# Patient Record
Sex: MALE | Race: WHITE | NOT HISPANIC OR LATINO | Employment: UNEMPLOYED | URBAN - METROPOLITAN AREA
[De-identification: names, ages, dates, MRNs, and addresses within clinical notes are randomized per-mention and may not be internally consistent; named-entity substitution may affect disease eponyms.]

---

## 2021-02-10 ENCOUNTER — OFFICE VISIT (OUTPATIENT)
Dept: URGENT CARE | Facility: PHYSICIAN GROUP | Age: 26
End: 2021-02-10
Payer: COMMERCIAL

## 2021-02-10 VITALS
BODY MASS INDEX: 24.92 KG/M2 | TEMPERATURE: 98.5 F | DIASTOLIC BLOOD PRESSURE: 60 MMHG | WEIGHT: 178 LBS | OXYGEN SATURATION: 96 % | HEIGHT: 71 IN | HEART RATE: 79 BPM | SYSTOLIC BLOOD PRESSURE: 110 MMHG | RESPIRATION RATE: 16 BRPM

## 2021-02-10 DIAGNOSIS — R21 RASH: ICD-10-CM

## 2021-02-10 PROCEDURE — 99203 OFFICE O/P NEW LOW 30 MIN: CPT | Performed by: PHYSICIAN ASSISTANT

## 2021-02-10 RX ORDER — TRIAMCINOLONE ACETONIDE 1 MG/G
0.25 CREAM TOPICAL 2 TIMES DAILY
Qty: 45 G | Refills: 0 | Status: SHIPPED | OUTPATIENT
Start: 2021-02-10 | End: 2021-02-24

## 2021-02-10 ASSESSMENT — ENCOUNTER SYMPTOMS
COUGH: 0
MYALGIAS: 0
SORE THROAT: 0
FEVER: 0
SHORTNESS OF BREATH: 0
PALPITATIONS: 0

## 2021-02-11 NOTE — PROGRESS NOTES
"Subjective:   Jan Ledbetter is a 25 y.o. male who presents for Rash (arm rash on and off on arms, x2 years )      Rash  This is a chronic problem. The current episode started more than 1 year ago. The problem is unchanged. Location: hand and arms. The rash is characterized by dryness, itchiness and redness. It is unknown if there was an exposure to a precipitant. Pertinent negatives include no cough, fever, joint pain, shortness of breath or sore throat. Past treatments include nothing.       Review of Systems   Constitutional: Negative for fever and malaise/fatigue.   HENT: Negative for sore throat.    Respiratory: Negative for cough and shortness of breath.    Cardiovascular: Negative for chest pain and palpitations.   Musculoskeletal: Negative for joint pain and myalgias.   Skin: Positive for itching and rash.   All other systems reviewed and are negative.      Medications:    • triamcinolone acetonide Crea    Allergies: Patient has no known allergies.    Problem List: Jan Ledbetter does not have a problem list on file.    Surgical History:  No past surgical history on file.    Past Social Hx: Jan Ledbetter  reports that he has never smoked. He has never used smokeless tobacco. He reports previous alcohol use. He reports that he does not use drugs.     Past Family Hx:  Jan Ledbetter family history is not on file.     Problem list, medications, and allergies reviewed by myself today in Epic.     Objective:     Blood Pressure 110/60 (BP Location: Right arm, Patient Position: Sitting, BP Cuff Size: Adult)   Pulse 79   Temperature 36.9 °C (98.5 °F) (Temporal)   Respiration 16   Height 1.803 m (5' 11\")   Weight 80.7 kg (178 lb)   Oxygen Saturation 96%   Body Mass Index 24.83 kg/m²     Physical Exam  Vitals reviewed.   Constitutional:       Appearance: He is well-developed.   Cardiovascular:      Rate and Rhythm: Normal rate and regular rhythm.      Heart sounds: Normal heart sounds.   Pulmonary:      Effort: " Pulmonary effort is normal.      Breath sounds: Normal breath sounds.   Musculoskeletal:         General: Normal range of motion.   Skin:     General: Skin is warm and dry.      Findings: Rash present.      Comments: Rash distributions:  Located:hand/arms  Size: various  Pattern: papulosquamous with escoriations   Neurological:      Mental Status: He is alert and oriented to person, place, and time.   Psychiatric:         Behavior: Behavior normal.         Thought Content: Thought content normal.         Judgment: Judgment normal.         Assessment/Plan:     Medical Decision Making/Comments     Pt is a 25 yr old male who presents for evaluation of a rash.  Pt states he has had a chronic rash for over 2 yrs.  Pt endorses pain with the rash.  No prior episodes.  Pt denies fever, sore throat, or arthralgias.   No new medications in last 2-3 months.  No recent travel, time outdoors, or occupational exposure.  Pt is up to date with vaccinations.  Vital signs normal.  On exam the rash appears to be distributed in a papulosquamous pattern with escoriations.  The rash is bilateral on the arms.  It does cover the flexor surfaces.  Likely eczema.    Diagnosis differential includes, but not limited to:   Morbilliform (maculopapular): measles, monocucleosis, viral exanthem, NOS, drug reaction, graft vs host, evolving.   Papulosquamous: syphilis, scabies, tinea, eczema, pityriasis rosea, psoriasis, lichen planus, lupus            Diagnosis and associated orders     1. Rash  triamcinolone acetonide (KENALOG) 0.1 % Cream              Differential diagnosis, natural history, supportive care, and indications for immediate follow-up discussed.    Advised the patient to follow-up with the primary care physician for recheck, reevaluation, and consideration of further management.    Please note that this dictation was created using voice recognition software. I have made a reasonable attempt to correct obvious errors, but I expect that  there are errors of grammar and possibly content that I did not discover before finalizing the note.

## 2022-05-13 ENCOUNTER — PRE-ADMISSION TESTING (OUTPATIENT)
Dept: ADMISSIONS | Facility: MEDICAL CENTER | Age: 27
End: 2022-05-13
Attending: ORTHOPAEDIC SURGERY
Payer: COMMERCIAL

## 2022-05-13 NOTE — OR NURSING
"Preadmit appointment: \" Preparing for your Procedure information\" sheet given to patient with verbal and written instructions. Patient instructed to continue prescribed medications through the day before surgery, instructed to take the following medications the day of surgery per anesthesia protocol:  NONE          Verbal and written, and pre-admit video website instructions provided.           "

## 2022-05-23 ENCOUNTER — ANESTHESIA (OUTPATIENT)
Dept: SURGERY | Facility: MEDICAL CENTER | Age: 27
End: 2022-05-23
Payer: COMMERCIAL

## 2022-05-23 ENCOUNTER — ANESTHESIA EVENT (OUTPATIENT)
Dept: SURGERY | Facility: MEDICAL CENTER | Age: 27
End: 2022-05-23
Payer: COMMERCIAL

## 2022-05-23 ENCOUNTER — APPOINTMENT (OUTPATIENT)
Dept: RADIOLOGY | Facility: MEDICAL CENTER | Age: 27
End: 2022-05-23
Attending: ORTHOPAEDIC SURGERY
Payer: COMMERCIAL

## 2022-05-23 ENCOUNTER — HOSPITAL ENCOUNTER (OUTPATIENT)
Facility: MEDICAL CENTER | Age: 27
End: 2022-05-23
Attending: ORTHOPAEDIC SURGERY | Admitting: ORTHOPAEDIC SURGERY
Payer: COMMERCIAL

## 2022-05-23 VITALS
RESPIRATION RATE: 16 BRPM | HEIGHT: 71 IN | HEART RATE: 105 BPM | TEMPERATURE: 97 F | BODY MASS INDEX: 24.38 KG/M2 | SYSTOLIC BLOOD PRESSURE: 130 MMHG | OXYGEN SATURATION: 98 % | WEIGHT: 174.16 LBS | DIASTOLIC BLOOD PRESSURE: 75 MMHG

## 2022-05-23 PROCEDURE — 160042 HCHG SURGERY MINUTES - EA ADDL 1 MIN LEVEL 5: Performed by: ORTHOPAEDIC SURGERY

## 2022-05-23 PROCEDURE — 700101 HCHG RX REV CODE 250: Performed by: ANESTHESIOLOGY

## 2022-05-23 PROCEDURE — 160009 HCHG ANES TIME/MIN: Performed by: ORTHOPAEDIC SURGERY

## 2022-05-23 PROCEDURE — 160048 HCHG OR STATISTICAL LEVEL 1-5: Performed by: ORTHOPAEDIC SURGERY

## 2022-05-23 PROCEDURE — 160031 HCHG SURGERY MINUTES - 1ST 30 MINS LEVEL 5: Performed by: ORTHOPAEDIC SURGERY

## 2022-05-23 PROCEDURE — A9270 NON-COVERED ITEM OR SERVICE: HCPCS | Performed by: ANESTHESIOLOGY

## 2022-05-23 PROCEDURE — 01230 ANES OPN UPPER 2/3 FEMUR NOS: CPT | Performed by: ANESTHESIOLOGY

## 2022-05-23 PROCEDURE — 700111 HCHG RX REV CODE 636 W/ 250 OVERRIDE (IP): Performed by: ANESTHESIOLOGY

## 2022-05-23 PROCEDURE — 160002 HCHG RECOVERY MINUTES (STAT): Performed by: ORTHOPAEDIC SURGERY

## 2022-05-23 PROCEDURE — 73502 X-RAY EXAM HIP UNI 2-3 VIEWS: CPT | Mod: RT

## 2022-05-23 PROCEDURE — 160036 HCHG PACU - EA ADDL 30 MINS PHASE I: Performed by: ORTHOPAEDIC SURGERY

## 2022-05-23 PROCEDURE — 501838 HCHG SUTURE GENERAL: Performed by: ORTHOPAEDIC SURGERY

## 2022-05-23 PROCEDURE — 700111 HCHG RX REV CODE 636 W/ 250 OVERRIDE (IP): Performed by: ORTHOPAEDIC SURGERY

## 2022-05-23 PROCEDURE — 160035 HCHG PACU - 1ST 60 MINS PHASE I: Performed by: ORTHOPAEDIC SURGERY

## 2022-05-23 PROCEDURE — 700102 HCHG RX REV CODE 250 W/ 637 OVERRIDE(OP): Performed by: ANESTHESIOLOGY

## 2022-05-23 PROCEDURE — 160046 HCHG PACU - 1ST 60 MINS PHASE II: Performed by: ORTHOPAEDIC SURGERY

## 2022-05-23 PROCEDURE — C1713 ANCHOR/SCREW BN/BN,TIS/BN: HCPCS | Performed by: ORTHOPAEDIC SURGERY

## 2022-05-23 DEVICE — ANCHOR SUTURE NANOTACK XBRAID TITANIUM.2MM TAPE 1.4MM(1EA): Type: IMPLANTABLE DEVICE | Site: HIP | Status: FUNCTIONAL

## 2022-05-23 RX ORDER — ONDANSETRON 2 MG/ML
INJECTION INTRAMUSCULAR; INTRAVENOUS PRN
Status: DISCONTINUED | OUTPATIENT
Start: 2022-05-23 | End: 2022-05-23 | Stop reason: SURG

## 2022-05-23 RX ORDER — CELECOXIB 200 MG/1
200 CAPSULE ORAL ONCE
Status: COMPLETED | OUTPATIENT
Start: 2022-05-23 | End: 2022-05-23

## 2022-05-23 RX ORDER — OXYCODONE HCL 5 MG/5 ML
5 SOLUTION, ORAL ORAL
Status: DISCONTINUED | OUTPATIENT
Start: 2022-05-23 | End: 2022-05-23 | Stop reason: HOSPADM

## 2022-05-23 RX ORDER — MIDAZOLAM HYDROCHLORIDE 1 MG/ML
INJECTION INTRAMUSCULAR; INTRAVENOUS PRN
Status: DISCONTINUED | OUTPATIENT
Start: 2022-05-23 | End: 2022-05-23 | Stop reason: SURG

## 2022-05-23 RX ORDER — SODIUM CHLORIDE, SODIUM LACTATE, POTASSIUM CHLORIDE, CALCIUM CHLORIDE 600; 310; 30; 20 MG/100ML; MG/100ML; MG/100ML; MG/100ML
INJECTION, SOLUTION INTRAVENOUS CONTINUOUS
Status: ACTIVE | OUTPATIENT
Start: 2022-05-23 | End: 2022-05-23

## 2022-05-23 RX ORDER — CEFAZOLIN SODIUM 1 G/3ML
INJECTION, POWDER, FOR SOLUTION INTRAMUSCULAR; INTRAVENOUS PRN
Status: DISCONTINUED | OUTPATIENT
Start: 2022-05-23 | End: 2022-05-23 | Stop reason: SURG

## 2022-05-23 RX ORDER — DIPHENHYDRAMINE HYDROCHLORIDE 50 MG/ML
12.5 INJECTION INTRAMUSCULAR; INTRAVENOUS
Status: DISCONTINUED | OUTPATIENT
Start: 2022-05-23 | End: 2022-05-23 | Stop reason: HOSPADM

## 2022-05-23 RX ORDER — HALOPERIDOL 5 MG/ML
1 INJECTION INTRAMUSCULAR
Status: DISCONTINUED | OUTPATIENT
Start: 2022-05-23 | End: 2022-05-23 | Stop reason: HOSPADM

## 2022-05-23 RX ORDER — ONDANSETRON 2 MG/ML
4 INJECTION INTRAMUSCULAR; INTRAVENOUS
Status: COMPLETED | OUTPATIENT
Start: 2022-05-23 | End: 2022-05-23

## 2022-05-23 RX ORDER — ACETAMINOPHEN 500 MG
1000 TABLET ORAL ONCE
Status: COMPLETED | OUTPATIENT
Start: 2022-05-23 | End: 2022-05-23

## 2022-05-23 RX ORDER — ROPIVACAINE HYDROCHLORIDE 5 MG/ML
INJECTION, SOLUTION EPIDURAL; INFILTRATION; PERINEURAL
Status: DISCONTINUED | OUTPATIENT
Start: 2022-05-23 | End: 2022-05-23 | Stop reason: HOSPADM

## 2022-05-23 RX ORDER — OXYCODONE HCL 5 MG/5 ML
10 SOLUTION, ORAL ORAL
Status: DISCONTINUED | OUTPATIENT
Start: 2022-05-23 | End: 2022-05-23 | Stop reason: HOSPADM

## 2022-05-23 RX ORDER — ROCURONIUM BROMIDE 10 MG/ML
INJECTION, SOLUTION INTRAVENOUS PRN
Status: DISCONTINUED | OUTPATIENT
Start: 2022-05-23 | End: 2022-05-23 | Stop reason: SURG

## 2022-05-23 RX ORDER — DEXAMETHASONE SODIUM PHOSPHATE 4 MG/ML
INJECTION, SOLUTION INTRA-ARTICULAR; INTRALESIONAL; INTRAMUSCULAR; INTRAVENOUS; SOFT TISSUE PRN
Status: DISCONTINUED | OUTPATIENT
Start: 2022-05-23 | End: 2022-05-23 | Stop reason: SURG

## 2022-05-23 RX ADMIN — CELECOXIB 200 MG: 200 CAPSULE ORAL at 09:56

## 2022-05-23 RX ADMIN — ROCURONIUM BROMIDE 80 MG: 10 INJECTION, SOLUTION INTRAVENOUS at 11:21

## 2022-05-23 RX ADMIN — SUGAMMADEX 200 MG: 100 INJECTION, SOLUTION INTRAVENOUS at 12:38

## 2022-05-23 RX ADMIN — ONDANSETRON 4 MG: 2 INJECTION INTRAMUSCULAR; INTRAVENOUS at 11:20

## 2022-05-23 RX ADMIN — PROPOFOL 200 MG: 10 INJECTION, EMULSION INTRAVENOUS at 11:11

## 2022-05-23 RX ADMIN — FENTANYL CITRATE 100 MCG: 50 INJECTION, SOLUTION INTRAMUSCULAR; INTRAVENOUS at 12:31

## 2022-05-23 RX ADMIN — CEFAZOLIN 2 G: 330 INJECTION, POWDER, FOR SOLUTION INTRAMUSCULAR; INTRAVENOUS at 11:11

## 2022-05-23 RX ADMIN — EPHEDRINE SULFATE 10 MG: 50 INJECTION INTRAMUSCULAR; INTRAVENOUS; SUBCUTANEOUS at 11:18

## 2022-05-23 RX ADMIN — FENTANYL CITRATE 50 MCG: 50 INJECTION, SOLUTION INTRAMUSCULAR; INTRAVENOUS at 11:11

## 2022-05-23 RX ADMIN — ONDANSETRON 4 MG: 2 INJECTION INTRAMUSCULAR; INTRAVENOUS at 13:37

## 2022-05-23 RX ADMIN — DEXAMETHASONE SODIUM PHOSPHATE 4 MG: 4 INJECTION, SOLUTION INTRAMUSCULAR; INTRAVENOUS at 11:20

## 2022-05-23 RX ADMIN — FENTANYL CITRATE 100 MCG: 50 INJECTION, SOLUTION INTRAMUSCULAR; INTRAVENOUS at 11:23

## 2022-05-23 RX ADMIN — MIDAZOLAM HYDROCHLORIDE 2 MG: 1 INJECTION, SOLUTION INTRAMUSCULAR; INTRAVENOUS at 11:07

## 2022-05-23 RX ADMIN — FENTANYL CITRATE 100 MCG: 50 INJECTION, SOLUTION INTRAMUSCULAR; INTRAVENOUS at 12:41

## 2022-05-23 RX ADMIN — HALOPERIDOL LACTATE 1 MG: 5 INJECTION, SOLUTION INTRAMUSCULAR at 14:20

## 2022-05-23 RX ADMIN — ACETAMINOPHEN 1000 MG: 500 TABLET ORAL at 09:55

## 2022-05-23 ASSESSMENT — PAIN SCALES - GENERAL: PAIN_LEVEL: 0

## 2022-05-23 ASSESSMENT — PAIN DESCRIPTION - PAIN TYPE
TYPE: SURGICAL PAIN
TYPE: CHRONIC PAIN
TYPE: SURGICAL PAIN

## 2022-05-23 NOTE — OP REPORT
DATE OF OPERATION: 2022    PREOPERATIVE DIAGNOSES:   1. Right hip pain.   2. Right hip Femoral Acetabular Impingement  3. Right hip Acetabular osseous metaplasia    POSTOPERATIVE DIAGNOSES:   1. Right hip pain.   2. Right hip Femoral Acetabular Impingement impingement  3. Right hip Acetabular osseous metaplasia    PROCEDURES:   1. Right hip arthroscopic debridement and chondroplasty.   2. Right hip arthroscopic peripheral compartment synovectomy.   3. Right hip femoral neck osteoplasty  4. Right hip acetabular ostoplasty  5.  Right hip labral repair    SURGEON: Mike Umana MD.   ASSISTANT: BRUCE Tran    ANESTHESIA:  .General  ANESTHESIOLOGIST: Dr. Jensen Lewis  COMPLICATIONS: None.   WEIGHTBEARIN%.     FINDINGS:   1. Stable femoral osteoplasty.   2. Stable acetabular osteoplasty  3.  Stable anterior lateral labral repair with 2 anchors    PROCEDURE IN DETAIL: The patient was taken to the operating room and then underwent a general anesthetic in the supine position. Bilateral legs placed   into traction on the [place] table with the left first, then the right, traction   post eccentric into the right thigh.  The right leg was addressed with   Hibiclens, alcohol and ChloraPrep, then the field draped using sterile technique. A timeout was undertaken and noted. The surgery was initiated with gentle distraction of   the right hip. Placement of a pertrochanteric guidewire followed by an   anterolateral under direct visualization. Diagnostic arthroscopy was   Undertaken.     The labrum showed significant degenerative changes on the anterior and lateral aspect.  This was noted with marked osseous metaplastic changes of the acetabulum above this.  In the setting the para labral sulcus was identified and the bur utilized to perform an acetabular osteoplasty.  The acetabulum was decompressed anterior and laterally to obtain a more normal position and decrease the acetabular retroversion.  The labrum was left  in situ on its articular surface.  Following this 2 anchors were placed anterior and anterolateral to obtain a labral repair.  These were a Nallely Madeleine tack.  This was a loop repair.    Once these were accomplished the SMC knot had been utilized and tightened and the labrum noted to be stable.  This was then treated with a motorized shaver and the ArthroCare.  The remnant articular surfaces were stabilized with the Orthotec and shaver as noted..  Fluoroscopic and direct localization were undertaken to obtain this.      As traction was let off, a distal anterolateral portal was made. The cannulated guide system was utilized to obtain access to the peripheral compartment. Blunt dissection was undertaken. Blade capsular incision followed with utilization of the shaver to perform a peripheral compartment synovectomy.        The traction was progressively let off the head-neck osteoplasty  Was undertaken starting first posterolaterally and then laterally and then anterolaterally.    The motorized deborah was  utilized to perform a femoral neck osteoplasty anterior, anterolateral and lateral. A stable decompression was accomplished under direct and fluoroscopic visualization and range of motion.     Final images were obtained, a stable decompression noted. Thorough   irrigation followed with periarticular anesthetization with ropivacaine and   closure of the incisions with nylon. The sterile bandage was applied. The   patient awoken from her anesthetic and taken to the recovery room, tolerated   the procedure very well with no signs of complications.

## 2022-05-23 NOTE — OR NURSING
0906 Procedure, patient allergies and NPO status verified. Home med rec completed and belongings secured. Patient verbalizes understanding of pain scale, expected course of stay and plan of care. IV access established. SCD placed on bilateral calves. Triple aim completed by GEOFF Hines.    1004 Preop complete.

## 2022-05-23 NOTE — ANESTHESIA POSTPROCEDURE EVALUATION
Patient: Jan Ledbetter    Procedure Summary     Date: 05/23/22 Room / Location:  OR  / SURGERY [facility]    Anesthesia Start: 1107 Anesthesia Stop: 1256    Procedures:       ARTHROSCOPY, HIP Labral repair (Right Hip)      OSTEOPLASTY, FEMUR, NECK (Right Hip)      OSTEOPLASTY, ACETABULUM (Right Hip) Diagnosis: (OTHER SPECIFIED JOINT DISORDERS; RIGHT HIP ELIZABETH)    Surgeons: Mike Umana M.D. Responsible Provider: Donald Lewis M.D.    Anesthesia Type: general ASA Status: 1          Final Anesthesia Type: general  Last vitals  BP   Blood Pressure: 139/73    Temp   36.2 °C (97.2 °F)    Pulse   60   Resp   16    SpO2   99 %      Anesthesia Post Evaluation    Patient location during evaluation: PACU  Patient participation: complete - patient participated  Level of consciousness: awake and alert  Pain score: 0    Airway patency: patent  Anesthetic complications: no  Cardiovascular status: hemodynamically stable  Respiratory status: acceptable  Hydration status: euvolemic    PONV: none          No complications documented.     Nurse Pain Score: 3 (NPRS)

## 2022-05-23 NOTE — DISCHARGE INSTRUCTIONS
ACTIVITY: Rest and take it easy for the first 24 hours.  A responsible adult is recommended to remain with you during that time.  It is normal to feel sleepy.  We encourage you to not do anything that requires balance, judgment or coordination.    MILD FLU-LIKE SYMPTOMS ARE NORMAL. YOU MAY EXPERIENCE GENERALIZED MUSCLE ACHES, THROAT IRRITATION, HEADACHE AND/OR SOME NAUSEA.    FOR 24 HOURS DO NOT:  Drive, operate machinery or run household appliances.  Drink beer or alcoholic beverages.   Make important decisions or sign legal documents.    SPECIAL INSTRUCTIONS: 75% Weightbearing on surgical leg with crutches. Wear ALANA (compressive stocking) for 2 weeks on both lower extremities.     DIET: To avoid nausea, slowly advance diet as tolerated, avoiding spicy or greasy foods for the first day.  Add more substantial food to your diet according to your physician's instructions.  INCREASE FLUIDS AND FIBER TO AVOID CONSTIPATION.    SURGICAL DRESSING/BATHING: Remove bandage in 5 days.  Replace sooner for drainage and notify office.     FOLLOW-UP APPOINTMENT:  Follow up [facility] 10-14 days, call to schedule.  Outpatient PT to begin in 2-4 days.     You should CALL YOUR PHYSICIAN if you develop:  Fever greater than 101 degrees F.  Pain not relieved by medication, or persistent nausea or vomiting.  Excessive bleeding (blood soaking through dressing) or unexpected drainage from the wound.  Extreme redness or swelling around the incision site, drainage of pus or foul smelling drainage.  Inability to urinate or empty your bladder within 8 hours.  Problems with breathing or chest pain.    You should call 911 if you develop problems with breathing or chest pain.  If you are unable to contact your doctor or surgical center, you should go to the nearest emergency room or urgent care center.  Physician's telephone #: [phone]    If any questions arise, call your doctor.  If your doctor is not available, please feel free to  call the Surgical Center at [phone].     A registered nurse may call you a few days after your surgery to see how you are doing after your procedure.    MEDICATIONS: Resume taking daily medication.  Take prescribed pain medication with food.  If no medication is prescribed, you may take non-aspirin pain medication if needed.  PAIN MEDICATION CAN BE VERY CONSTIPATING.  Take a stool softener or laxative such as senokot, pericolace, or milk of magnesia if needed.    Prescription given for Norco, ASA, Meloxicam. Start Aspirin 325mg per day. Use for thirty days.  Last pain medication given at _______________________________.    If your physician has prescribed pain medication that includes Acetaminophen (Tylenol), do not take additional Acetaminophen (Tylenol) while taking the prescribed medication.    Depression / Suicide Risk    As you are discharged from this [facility], it is important to learn how to keep safe from harming yourself.    Recognize the warning signs:  Abrupt changes in personality, positive or negative- including increase in energy   Giving away possessions  Change in eating patterns- significant weight changes-  positive or negative  Change in sleeping patterns- unable to sleep or sleeping all the time   Unwillingness or inability to communicate  Depression  Unusual sadness, discouragement and loneliness  Talk of wanting to die  Neglect of personal appearance   Rebelliousness- reckless behavior  Withdrawal from people/activities they love  Confusion- inability to concentrate     If you or a loved one observes any of these behaviors or has concerns about self-harm, here's what you can do:  Talk about it- your feelings and reasons for harming yourself  Remove any means that you might use to hurt yourself (examples: pills, rope, extension cords, firearm)  Get professional help from the community (Mental Health, Substance Abuse, psychological counseling)  Do not be alone:Call your Safe  Contact- someone whom you trust who will be there for you.  Call your local CRISIS HOTLINE 848-7176 or 862-766-3044  Call your local Children's Mobile Crisis Response Team [place] (757) 662-0712 or www.[employer]  Call the toll free National Suicide Prevention Hotlines   National Suicide Prevention Lifeline 755-329-UUCQ (3597)  [place] Line Network 800-SUICIDE (723-1470)

## 2022-05-23 NOTE — ANESTHESIA PROCEDURE NOTES
Airway    Date/Time: 5/23/2022 11:11 AM  Performed by: Donald Lewis M.D.  Authorized by: Donald Lewis M.D.     Location:  OR  Urgency:  Elective  Indications for Airway Management:  Anesthesia      Spontaneous Ventilation: absent    Sedation Level:  Deep  Preoxygenated: Yes    Final Airway Type:  Supraglottic airway  Final Supraglottic Airway:  Standard LMA    SGA Size:  4  Number of Attempts at Approach:  1

## 2022-05-23 NOTE — OR NURSING
1409: To stage ll. Up to chair and dressed w/ CNA assist. Pain is tolerable. Slight nausea.    1422: Medicated for nausea.    1428: Home care instructions reviewed w/ pt and GF. No questions. Nausea is better.     1503: Meets criteria for discharge.

## 2022-05-23 NOTE — PROGRESS NOTES
1200-received patient from OR. Oral airway in place and vital signs are stable.    1206-oral airway removed. No respiratory distress noted.    1313-Maria Guadalupe girlfriend updated.    1340-patient c/o slight nausea. A dose of zofran IV was given

## 2022-05-23 NOTE — ANESTHESIA PREPROCEDURE EVALUATION
Case: [id number] Date/Time: 05/23/22 1030    Procedures:       ARTHROSCOPY, HIP (Right )      OSTEOPLASTY, FEMUR, NECK      OSTEOPLASTY, ACETABULUM - AND REPAIRS AS INDICATED    Pre-op diagnosis: OTHER SPECIFIED JOINT DISORDERS; RIGHT HIP ELIZABETH    Location:  OR  / SURGERY [facility]    Surgeons: Mike Umana M.D.          Relevant Problems   No relevant active problems       Physical Exam    Airway   Mallampati: II  TM distance: >3 FB  Neck ROM: full       Cardiovascular - normal exam  Rhythm: regular  Rate: normal  (-) murmur     Dental - normal exam           Pulmonary - normal exam  Breath sounds clear to auscultation     Abdominal    Neurological - normal exam                 Anesthesia Plan    ASA 1       Plan - general       Airway plan will be LMA          Induction: intravenous    Postoperative Plan: Postoperative administration of opioids is intended.    Pertinent diagnostic labs and testing reviewed    Informed Consent:    Anesthetic plan and risks discussed with patient.    Use of blood products discussed with: patient whom consented to blood products.

## 2022-05-23 NOTE — ANESTHESIA TIME REPORT
Anesthesia Start and Stop Event Times     Date Time Event    5/23/2022 1106 Ready for Procedure     1107 Anesthesia Start     1256 Anesthesia Stop        Responsible Staff  05/23/22    Name Role Begin End    Donald Lewis M.D. Anesth 1107 1256        Overtime Reason:  no overtime (within assigned shift)    Comments:

## (undated) DEVICE — SUTURE GENERAL

## (undated) DEVICE — CHLORAPREP 26 ML APPLICATOR - ORANGE TINT(25/CA)

## (undated) DEVICE — GLOVE BIOGEL PI INDICATOR SZ 8.0 SURGICAL PF LF -(50/BX 4BX/CA)

## (undated) DEVICE — HEAD HOLDER JUNIOR/ADULT

## (undated) DEVICE — GOWN WARMING STANDARD FLEX - (30/CA)

## (undated) DEVICE — DRAPE STRLE REG TOWEL 18X24 - (10/BX 4BX/CA)"

## (undated) DEVICE — BAG SPONGE COUNT 10.25 X 32 - BLUE (250/CA)

## (undated) DEVICE — DRAPE LARGE 3 QUARTER - (20/CA)

## (undated) DEVICE — ELECTRODE DUAL RETURN W/ CORD - (50/PK)

## (undated) DEVICE — DRAPE VERTICAL ISOLATION - (10EA/CA)

## (undated) DEVICE — TUBING CASSETTE CROSSFLOW INTEGRATED (10EA/CA)

## (undated) DEVICE — GOWN SURGEONS X-LARGE - DISP. (30/CA)

## (undated) DEVICE — DRESSING INTEGUSEAL MICROBIAL SEALANT IS100 (20EA/CA)

## (undated) DEVICE — FORMULA HIP RESECTOR XL (5EA/BX)

## (undated) DEVICE — STAPLER SKIN DISP - (6/BX 10BX/CA) VISISTAT

## (undated) DEVICE — SYRINGE EAR/NOSE 3 OZ STERILE (50/CA

## (undated) DEVICE — TUBE CONNECTING SUCTION - CLEAR PLASTIC STERILE 72 IN (50EA/CA)

## (undated) DEVICE — CANISTER SUCTION RIGID RED 1500CC (40EA/CA)

## (undated) DEVICE — GOWN SURGICAL XX-LARGE - (28EA/CA) SIRUS NON REINFORCED

## (undated) DEVICE — GLOVE BIOGEL INDICATOR SZ 7.5 SURGICAL PF LTX - (50PR/BX 4BX/CA)

## (undated) DEVICE — LACTATED RINGERS INJ 1000 ML - (14EA/CA 60CA/PF)

## (undated) DEVICE — TOWELS CLOTH SURGICAL - (4/PK 20PK/CA)

## (undated) DEVICE — GOWN SURGICAL X-LARGE ULTRA - FILM-REINFORCED (20/CA)

## (undated) DEVICE — GLOVE, LITE (PAIR)

## (undated) DEVICE — KIT ANESTHESIA W/CIRCUIT & 3/LT BAG W/FILTER (20EA/CA)

## (undated) DEVICE — Device

## (undated) DEVICE — GLOVE PROTEXIS PI MICRO SZ 8.5 (200PR/CA)

## (undated) DEVICE — BLADE BEAVER 6400 MINI EYE ROUND TIP SHARP ON ONE SIDE (20/CA)

## (undated) DEVICE — GLOVE SURGICAL PROTEXIS 8 1/2 - (50PR/BX)

## (undated) DEVICE — WATER IRRIGATION STERILE 1000ML (12EA/CA)

## (undated) DEVICE — TUBING DAY USE W/CARTRIDGE (10EA/BX)

## (undated) DEVICE — PROTECTOR ULNA NERVE - (36PR/CA)

## (undated) DEVICE — SUTURE 3-0 ETHILON FS-1 - (36/BX) 30 INCH

## (undated) DEVICE — GLOVE BIOGEL PI ORTHO SZ 9 PF LF

## (undated) DEVICE — GLOVE BIOGEL PI INDICATOR SZ 7.5 SURGICAL PF LF -(50/BX 4BX/CA)

## (undated) DEVICE — PROBE VULCAN INTEGRA ABLATOR - E-FLEX

## (undated) DEVICE — SHAVER HIP 8 FLUTE XL 5.5MM (5EA/BX)

## (undated) DEVICE — BLADE SHAVER BONECUTTER 4.5MM DSP (3EA/BX)

## (undated) DEVICE — SUCTION INSTRUMENT YANKAUER BULBOUS TIP W/O VENT (50EA/CA)

## (undated) DEVICE — ELECTRODE 850 FOAM ADHESIVE - HYDROGEL RADIOTRNSPRNT (50/PK)

## (undated) DEVICE — MASK ANESTHESIA ADULT  - (100/CA)

## (undated) DEVICE — BLADE FLAT SHARP ON ONE SIDE ROUNDED TIP ARTHRO-LOK 4MM (6EA/SP)

## (undated) DEVICE — GLOVE PROTEXIS LATEX MICRO SIZE 9 (50PR/BX)

## (undated) DEVICE — BUR HIP AGGRESSIVE PLUS STRIGHT LONG 5.0MM (5EA/BX)

## (undated) DEVICE — GLOVE BIOGEL SZ 8.5 SURGICAL PF LTX - (50PR/BX 4BX/CA)

## (undated) DEVICE — NEPTUNE 4 PORT MANIFOLD - (20/PK)

## (undated) DEVICE — SODIUM CHL. IRRIGATION 0.9% 3000ML (4EA/CA 65CA/PF)

## (undated) DEVICE — SODIUM CHL IRRIGATION 0.9% 1000ML (12EA/CA)

## (undated) DEVICE — SHAVER PLUS ELITE LONG INCISOR 4.5MM (3EA/BX)

## (undated) DEVICE — HUMID-VENT HEAT AND MOISTURE EXCHANGE- (50/BX)

## (undated) DEVICE — DRAPE C-ARM LARGE 41IN X 74 IN - (10/BX 2BX/CA)

## (undated) DEVICE — GLOVE BIOGEL SZ 8 SURGICAL PF LTX - (50PR/BX 4BX/CA)

## (undated) DEVICE — GLOVE BIOGEL SZ 7.5 SURGICAL PF LTX - (50PR/BX 4BX/CA)

## (undated) DEVICE — TOWEL STOP TIMEOUT SAFETY FLAG (40EA/CA)